# Patient Record
Sex: FEMALE | Race: BLACK OR AFRICAN AMERICAN | NOT HISPANIC OR LATINO | URBAN - METROPOLITAN AREA
[De-identification: names, ages, dates, MRNs, and addresses within clinical notes are randomized per-mention and may not be internally consistent; named-entity substitution may affect disease eponyms.]

---

## 2022-02-24 ENCOUNTER — EMERGENCY (EMERGENCY)
Facility: HOSPITAL | Age: 47
LOS: 1 days | Discharge: HOME | End: 2022-02-24
Attending: EMERGENCY MEDICINE | Admitting: EMERGENCY MEDICINE
Payer: MEDICAID

## 2022-02-24 VITALS
OXYGEN SATURATION: 99 % | RESPIRATION RATE: 16 BRPM | HEIGHT: 66 IN | HEART RATE: 80 BPM | SYSTOLIC BLOOD PRESSURE: 145 MMHG | TEMPERATURE: 99 F | DIASTOLIC BLOOD PRESSURE: 81 MMHG

## 2022-02-24 VITALS
HEART RATE: 69 BPM | DIASTOLIC BLOOD PRESSURE: 72 MMHG | RESPIRATION RATE: 16 BRPM | TEMPERATURE: 98 F | OXYGEN SATURATION: 100 % | SYSTOLIC BLOOD PRESSURE: 129 MMHG

## 2022-02-24 LAB
ALBUMIN SERPL ELPH-MCNC: 4 G/DL — SIGNIFICANT CHANGE UP (ref 3.3–5)
ALP SERPL-CCNC: 59 U/L — SIGNIFICANT CHANGE UP (ref 40–120)
ALT FLD-CCNC: 15 U/L — SIGNIFICANT CHANGE UP (ref 4–33)
ANION GAP SERPL CALC-SCNC: 9 MMOL/L — SIGNIFICANT CHANGE UP (ref 7–14)
APPEARANCE UR: CLEAR — SIGNIFICANT CHANGE UP
AST SERPL-CCNC: 20 U/L — SIGNIFICANT CHANGE UP (ref 4–32)
BACTERIA # UR AUTO: ABNORMAL
BASOPHILS # BLD AUTO: 0.03 K/UL — SIGNIFICANT CHANGE UP (ref 0–0.2)
BASOPHILS NFR BLD AUTO: 0.3 % — SIGNIFICANT CHANGE UP (ref 0–2)
BILIRUB SERPL-MCNC: <0.2 MG/DL — SIGNIFICANT CHANGE UP (ref 0.2–1.2)
BILIRUB UR-MCNC: NEGATIVE — SIGNIFICANT CHANGE UP
BLD GP AB SCN SERPL QL: NEGATIVE — SIGNIFICANT CHANGE UP
BUN SERPL-MCNC: 9 MG/DL — SIGNIFICANT CHANGE UP (ref 7–23)
CALCIUM SERPL-MCNC: 9.1 MG/DL — SIGNIFICANT CHANGE UP (ref 8.4–10.5)
CHLORIDE SERPL-SCNC: 102 MMOL/L — SIGNIFICANT CHANGE UP (ref 98–107)
CO2 SERPL-SCNC: 25 MMOL/L — SIGNIFICANT CHANGE UP (ref 22–31)
COLOR SPEC: SIGNIFICANT CHANGE UP
CREAT SERPL-MCNC: 0.73 MG/DL — SIGNIFICANT CHANGE UP (ref 0.5–1.3)
DIFF PNL FLD: ABNORMAL
EOSINOPHIL # BLD AUTO: 0.18 K/UL — SIGNIFICANT CHANGE UP (ref 0–0.5)
EOSINOPHIL NFR BLD AUTO: 1.9 % — SIGNIFICANT CHANGE UP (ref 0–6)
EPI CELLS # UR: 5 /HPF — SIGNIFICANT CHANGE UP (ref 0–5)
GLUCOSE SERPL-MCNC: 95 MG/DL — SIGNIFICANT CHANGE UP (ref 70–99)
GLUCOSE UR QL: NEGATIVE — SIGNIFICANT CHANGE UP
HCG SERPL-ACNC: 1597 MIU/ML — SIGNIFICANT CHANGE UP
HCT VFR BLD CALC: 31.9 % — LOW (ref 34.5–45)
HGB BLD-MCNC: 10.8 G/DL — LOW (ref 11.5–15.5)
HYALINE CASTS # UR AUTO: 1 /LPF — SIGNIFICANT CHANGE UP (ref 0–7)
IANC: 6.32 K/UL — SIGNIFICANT CHANGE UP (ref 1.5–8.5)
IMM GRANULOCYTES NFR BLD AUTO: 0.1 % — SIGNIFICANT CHANGE UP (ref 0–1.5)
KETONES UR-MCNC: NEGATIVE — SIGNIFICANT CHANGE UP
LEUKOCYTE ESTERASE UR-ACNC: NEGATIVE — SIGNIFICANT CHANGE UP
LYMPHOCYTES # BLD AUTO: 2.7 K/UL — SIGNIFICANT CHANGE UP (ref 1–3.3)
LYMPHOCYTES # BLD AUTO: 28 % — SIGNIFICANT CHANGE UP (ref 13–44)
MCHC RBC-ENTMCNC: 29.6 PG — SIGNIFICANT CHANGE UP (ref 27–34)
MCHC RBC-ENTMCNC: 33.9 GM/DL — SIGNIFICANT CHANGE UP (ref 32–36)
MCV RBC AUTO: 87.4 FL — SIGNIFICANT CHANGE UP (ref 80–100)
MONOCYTES # BLD AUTO: 0.42 K/UL — SIGNIFICANT CHANGE UP (ref 0–0.9)
MONOCYTES NFR BLD AUTO: 4.3 % — SIGNIFICANT CHANGE UP (ref 2–14)
NEUTROPHILS # BLD AUTO: 6.32 K/UL — SIGNIFICANT CHANGE UP (ref 1.8–7.4)
NEUTROPHILS NFR BLD AUTO: 65.4 % — SIGNIFICANT CHANGE UP (ref 43–77)
NITRITE UR-MCNC: NEGATIVE — SIGNIFICANT CHANGE UP
NRBC # BLD: 0 /100 WBCS — SIGNIFICANT CHANGE UP
NRBC # FLD: 0 K/UL — SIGNIFICANT CHANGE UP
PH UR: 6.5 — SIGNIFICANT CHANGE UP (ref 5–8)
PLATELET # BLD AUTO: 161 K/UL — SIGNIFICANT CHANGE UP (ref 150–400)
POTASSIUM SERPL-MCNC: 4.5 MMOL/L — SIGNIFICANT CHANGE UP (ref 3.5–5.3)
POTASSIUM SERPL-SCNC: 4.5 MMOL/L — SIGNIFICANT CHANGE UP (ref 3.5–5.3)
PROT SERPL-MCNC: 7.4 G/DL — SIGNIFICANT CHANGE UP (ref 6–8.3)
PROT UR-MCNC: ABNORMAL
RBC # BLD: 3.65 M/UL — LOW (ref 3.8–5.2)
RBC # FLD: 14.4 % — SIGNIFICANT CHANGE UP (ref 10.3–14.5)
RBC CASTS # UR COMP ASSIST: 1 /HPF — SIGNIFICANT CHANGE UP (ref 0–4)
RH IG SCN BLD-IMP: POSITIVE — SIGNIFICANT CHANGE UP
SODIUM SERPL-SCNC: 136 MMOL/L — SIGNIFICANT CHANGE UP (ref 135–145)
SP GR SPEC: 1.01 — SIGNIFICANT CHANGE UP (ref 1–1.05)
UROBILINOGEN FLD QL: SIGNIFICANT CHANGE UP
WBC # BLD: 9.66 K/UL — SIGNIFICANT CHANGE UP (ref 3.8–10.5)
WBC # FLD AUTO: 9.66 K/UL — SIGNIFICANT CHANGE UP (ref 3.8–10.5)
WBC UR QL: 7 /HPF — HIGH (ref 0–5)

## 2022-02-24 PROCEDURE — 99285 EMERGENCY DEPT VISIT HI MDM: CPT

## 2022-02-24 PROCEDURE — 76830 TRANSVAGINAL US NON-OB: CPT | Mod: 26

## 2022-02-24 NOTE — CONSULT NOTE ADULT - SUBJECTIVE AND OBJECTIVE BOX
RICKEY NYE  46y  Female [id number]    HPI: 46 year old , LMP early december presenting to the ED with 1 episode of vaginal spotting today. Patient states that she had a positive urine pregnancy 2 days ago and in the setting of being pregnant and having spotting she decided to call her OBGYN. Her OBGYN advised her to go to the ED for evaluation. Patient states that she only had one episode of spotting noted when she wiped after urinating. Did not need to use any pads today. Denies any abdominal cramping, changes in vaginal discharge, dysuria, headaches, chest pain, sob, nausea, vomiting.    This is a highly desired pregnancy    Name of GYN Physician: Dr. Gaitan     POB:   @25 weeks PPROM    Pgyn: Denies fibroids, cysts, STI's, Abnormal pap smears , irregular periods  PMH: None  PSH: None    Social History:  Denies smoking use, drug use, alcohol use.     Vital Signs Last 24 Hrs  T(C): 36.7 (2022 21:32), Max: 37 (2022 18:01)  T(F): 98.1 (2022 21:32), Max: 98.6 (2022 18:01)  HR: 69 (2022 21:32) (69 - 80)  BP: 129/72 (2022 21:32) (129/72 - 145/81)  BP(mean): --  RR: 16 (2022 21:32) (16 - 16)  SpO2: 100% (2022 21:32) (99% - 100%)    Physical Exam:   General: sitting comfortably in bed, NAD   CV: RR, S1S2 present, no m/r/g  Lungs: CTA b/l  Back: No CVA tenderness  Abd: Soft, non-tender, non-distended.  Bowel sounds present.    :  No bleeding on pad.    External labia wnl.  Bimanual exam with no cervical motion tenderness, uterus wnl, adnexa non palpable b/l.    Speculum Exam: No active bleeding from os.  Physiologic discharge.    Ext: non-tender b/l, no edema     LABS:                            10.8   9.66  )-----------( 161      ( 2022 19:43 )             31.9         136  |  102  |  9   ----------------------------<  95  4.5   |  25  |  0.73    Ca    9.1      2022 19:43    TPro  7.4  /  Alb  4.0  /  TBili  <0.2  /  DBili  x   /  AST  20  /  ALT  15  /  AlkPhos  59      I&O's Detail      Urinalysis Basic - ( 2022 19:43 )    Color: Light Yellow / Appearance: Clear / S.013 / pH: x  Gluc: x / Ketone: Negative  / Bili: Negative / Urobili: <2 mg/dL   Blood: x / Protein: Trace / Nitrite: Negative   Leuk Esterase: Negative / RBC: 1 /HPF / WBC 7 /HPF   Sq Epi: x / Non Sq Epi: 5 /HPF / Bacteria: Few        RADIOLOGY & ADDITIONAL STUDIES:  TVUS: ACC: [id number] EXAM:  US TRANSVAGINAL                          PROCEDURE DATE:  2022          INTERPRETATION:  CLINICAL INFORMATION: Vaginal spotting, positive   pregnancy test    LMP: 2021, unreliable due to irregular menses.    COMPARISON: None available.    TECHNIQUE:  Endovaginal pelvic sonogram only. Color and Spectral Doppler was   performed.    FINDINGS:    Uterus/endometrium: A fibroid in the lower uterus measures 6.8 x 6.1 x 6   cm. Intrauterine pregnancy.    Mean gestational sac size measures 2.4 cm. Amnionic cavity is identified.   Along the periphery of the amniotic cavity, there is a 5 mm echogenic   focus which may reflect the crown-rump length/fetal pole providing a   gestational age of 6 weeks, 2 days. No definitive yolk sac is identified.   No fetal heart rate is detected.    Gestational Sac Size (mean): 24 mm.  Gestations Sac Shape : Normal.  Estimated Gestational Age: N/A  Yolk Sac: Not definitively identified.  Fetal Heart Rate: Not identified.    Right ovary: 3.9 cm x 2.7 cm x 4.2 cm. 2.4 cm corpus luteum. Normal   arterial and venous waveforms.  Left ovary: 3.7 cm x 2.2 cm x 2.6 cm. Within normal limits. Normal venous   waveform.    Fluid: None.    IMPRESSION:    Gestational sac and amniotic cavity. Questionable fetal pole. No yolk sac   or heart rate is detected. Findings are suspicious but not diagnostic of   pregnancy failure.    Dr. Marmolejo was informed by Dr. Pantoja with read back confirmation on   2022 at 9:45 PM.    --- End of Report ---          KAE PANTOJA MD; Resident Radiology  This document has been electronically signed.  HARINI DURON MD; Attending Radiologist  This document has been electronically signed. 2022 10:04PM

## 2022-02-24 NOTE — CONSULT NOTE ADULT - ASSESSMENT
46 year old , LMP early december presenting to the ED with one episode of vaginal spotting in the setting of a positive at home pregnancy test. VSS, CBC wnl. bHCG 1597. TVUS suspicious for pregnancy failure.     -Patient to follow up with her OBGYN Dr. Gaitan   -Referral for Dr. Tamia Ignacio given at patients request   -No acute OBGYN intervention at this time  -Patient cleared from GYN perspective   -Continue care as per ED     d/w Dr. Mikey Curtis, PGY2 46 year old , LMP early december presenting to the ED with one episode of vaginal spotting in the setting of a positive at home pregnancy test. VSS, CBC wnl. bHCG 1597. TVUS suspicious for pregnancy failure.     -Patient to follow up with her OBGYN Dr. Gaitan   -Referral for Dr. Tamia Ignacio given at patients request   -No acute OBGYN intervention at this time  -Patient cleared from GYN perspective   -Continue care as per ED     d/w Dr. Mikey Curtis, PGY2    47 y/o , LMP early December c/o vaginal spotting.  TVUS suggestive of pregnancy failure. Pt to f/u with Ob/Gyn as an outpatient.  Jose Jensen M.D.

## 2022-02-24 NOTE — ED PROVIDER NOTE - NS ED ROS FT
ROS:  GENERAL: No fever, no chills  EYES: no change in vision  HEENT: no trouble swallowing, no trouble speaking  CARDIAC: no chest pain  PULMONARY: no cough, no shortness of breath  GI: no abdominal pain, no nausea, no vomiting, no diarrhea, no constipation, +vag spotting  : No dysuria, no frequency, no change in appearance, or odor of urine  SKIN: no rashes  NEURO: no headache, no weakness  MSK: No joint pain

## 2022-02-24 NOTE — ED PROVIDER NOTE - CLINICAL SUMMARY MEDICAL DECISION MAKING FREE TEXT BOX
46f , lmp sometime in december (doesn't recall when), h/o irregular periods with +home preg test 2 d ago, with vaginal spotting today that has now stopped. No other symptoms, vss, very well appearing, benign exam, abd soft/nt, plan for labs, ua/cx, tvus-eval for threatened ab v. ectopic v early preg.

## 2022-02-24 NOTE — ED PROVIDER NOTE - PATIENT PORTAL LINK FT
You can access the FollowMyHealth Patient Portal offered by [facility] by registering at the following website: [email or web address]. By joining [employer]’s FollowMyHealth portal, you will also be able to view your health information using other applications (apps) compatible with our system.

## 2022-02-24 NOTE — ED PROVIDER NOTE - NSFOLLOWUPINSTRUCTIONS_ED_ALL_ED_FT
Follow up with your OBGYN in bring copies of your results.  Return to the ER for worsening or persistent symptoms, including but not limited to worsening/persistent pain, shortness of breath, fevers, vomiting, lightheadedness, passing out and/or ANY NEW OR CONCERNING SYMPTOMS. If you have issues obtaining follow up, please call: [phone] to obtain a doctor or specialist who takes your insurance in your area.  You may call [phone] to make an appointment with the internal medicine clinic.

## 2022-02-24 NOTE — ED ADULT NURSE NOTE - OBJECTIVE STATEMENT
Break coverage- Pt received into intake spot #8. AAOx4, c/o intermittent mild abdominal cramping and light vaginal spotting started this morning. Pt states she took a home pregnancy test 2 days ago that was positive. Denies dizziness/sob. MD aponte performed. #20g IV placed to right ac, labs drawn and sent. Awaiting ultrasound test and further plan of care.

## 2022-02-24 NOTE — ED PROVIDER NOTE - PHYSICAL EXAMINATION
vital signs: T(C): 37 (02-24-22 @ 18:01), Max: 37 (02-24-22 @ 18:01)  HR: 80 (02-24-22 @ 18:01) (80 - 80)  BP: 145/81 (02-24-22 @ 18:01) (145/81 - 145/81)  BP(mean): --  RR: 16 (02-24-22 @ 18:01) (16 - 16)  SpO2: 99% (02-24-22 @ 18:01) (99% - 99%)  GEN - NAD; well appearing; A+O x3   HEAD - NC/AT   EYES- PERRL, EOMI  ENT: Airway patent, mmm, Oral cavity and pharynx normal.   NECK: Neck supple, non-tender without lymphadenopathy, no masses.  PULMONARY - CTA b/l, symmetric breath sounds.   CARDIAC -s1s2, RRR, no M,G,R  ABDOMEN - +BS, ND, NT, soft, no guarding, no rebound  BACK - no CVA tenderness, Normal  spine   EXTREMITIES - FROM, no deformity  SKIN - no rash or bruising   NEUROLOGIC - alert, speech clear, no focal deficits  PSYCH -nl mood/affect, nl insight.

## 2022-02-24 NOTE — ED PROVIDER NOTE - OBJECTIVE STATEMENT
46f , lmp december (h/o irregular periods) presents to the ed with vag spotting. Took home preg test two days ago as her breasts felt heavy for a few weeks and found it to be positive. Today this morning noted trace vaginal spotting which has since stopped. No abdominal pain or cramping. No fevers, nausea, vomiting, cp, sob, dysuria, hematuria.

## 2022-02-24 NOTE — ED ADULT TRIAGE NOTE - CHIEF COMPLAINT QUOTE
Pt c/o of vaginal spotting beginning this morning, pt reports she took home pregnancy test 2 days ago with positive result. Pt denies abd cramping, n/v/f/c,  symptoms.

## 2022-02-26 LAB
CULTURE RESULTS: SIGNIFICANT CHANGE UP
SPECIMEN SOURCE: SIGNIFICANT CHANGE UP

## 2022-09-22 NOTE — ED PROVIDER NOTE - NSICDXNOPASTSURGICALHX_GEN_ALL_ED
Prior Authorization Form:      DEMOGRAPHICS:                     Patient Name:  Johann Dan  Patient :  1933            Insurance:  Payor: Jordy Munoz / Plan: Partha Gillis PPO / Product Type: Medicare /   Insurance ID Number:    Payer/Plan Subscr  Sex Relation Sub. Ins. ID Effective Group Num   1.  Jordy Tammy* Kirti MATHIS 1933 Female Self [id number]                                    [id number]         DIAGNOSIS & PROCEDURE:                       Procedure/Operation: RELEASE TRANSVERSE CARPAL RIGHT LIGAMENT           CPT Code: 14721    Diagnosis:  RIGHT CARPAL TUNNEL SYNDROME    ICD10 Code: G56.01    Location:  [address] [place] [address]    Surgeon:  Juan Shah D.O.    SCHEDULING INFORMATION:                          Date: 10/06/2022    Time:               Anesthesia:  Sandy Block                                                       Status:  Outpatient        Special Comments:  NONE       Electronically signed by Jarvis Pedraza on 2022 at 10:07 AM
<-- Click to add NO significant Past Surgical History

## 2023-11-28 NOTE — ED ADULT TRIAGE NOTE - PATIENT ON (OXYGEN DELIVERY METHOD)
Received via stretcher from PACU, no distress observed at this time. Dressing dry and intact to Left foot. Son present in room with patient.   room air